# Patient Record
Sex: MALE | Race: ASIAN | NOT HISPANIC OR LATINO
[De-identification: names, ages, dates, MRNs, and addresses within clinical notes are randomized per-mention and may not be internally consistent; named-entity substitution may affect disease eponyms.]

---

## 2020-12-11 ENCOUNTER — APPOINTMENT (OUTPATIENT)
Dept: UROLOGY | Facility: CLINIC | Age: 35
End: 2020-12-11
Payer: COMMERCIAL

## 2020-12-11 VITALS
DIASTOLIC BLOOD PRESSURE: 77 MMHG | HEART RATE: 94 BPM | WEIGHT: 180 LBS | BODY MASS INDEX: 28.93 KG/M2 | SYSTOLIC BLOOD PRESSURE: 111 MMHG | RESPIRATION RATE: 18 BRPM | HEIGHT: 66 IN | OXYGEN SATURATION: 97 % | TEMPERATURE: 98.2 F

## 2020-12-11 DIAGNOSIS — Z78.9 OTHER SPECIFIED HEALTH STATUS: ICD-10-CM

## 2020-12-11 DIAGNOSIS — Z00.00 ENCOUNTER FOR GENERAL ADULT MEDICAL EXAMINATION W/OUT ABNORMAL FINDINGS: ICD-10-CM

## 2020-12-11 DIAGNOSIS — F52.4 PREMATURE EJACULATION: ICD-10-CM

## 2020-12-11 DIAGNOSIS — N46.9 MALE INFERTILITY, UNSPECIFIED: ICD-10-CM

## 2020-12-11 DIAGNOSIS — N50.3 CYST OF EPIDIDYMIS: ICD-10-CM

## 2020-12-11 PROCEDURE — 99072 ADDL SUPL MATRL&STAF TM PHE: CPT

## 2020-12-11 PROCEDURE — 99204 OFFICE O/P NEW MOD 45 MIN: CPT

## 2020-12-11 NOTE — ADDENDUM
[FreeTextEntry1] : Entered by Rolando Roy, acting as scribe for Dr. Heriberto Hagan.\par \par The documentation recorded by the scribe accurately reflects the service I personally performed and the decisions made by me.\par

## 2020-12-11 NOTE — LETTER BODY
[FreeTextEntry1] : Camden Clancy MD\par [address], [address]\[place] [address]\par [phone]\par \par Dear Dr. Clancy,\par \par Reason for Visit: Right spermatocele. Premature ejaculation. Infertility.\par \par This is a 35 year-old Mandarin-speaking working gentleman with right spermatocele, premature ejaculation, and infertility. Patient is referred for evaluation of his condition. Patient denies any gross hematuria or dysuria or urinary incontinence. The patient denies any aggravating or relieving factors. The patient denies any interference of function. The patient is entirely asymptomatic. All other review of systems are negative. He has no cancer in his family medical history. He has no previous surgical history. Past medical history, family history and social history were inquired and were noncontributory to current condition. The patient does not use tobacco or drink alcohol. Medications and allergies were reviewed. He has no known allergies to medication. \par \par On examination, the patient is a healthy-appearing gentleman in no acute distress. He is alert and oriented follows commands. He has normal mood and affect. He is normocephalic. Oral no thrush. Neck is supple. Respirations are unlabored. His abdomen is soft and nontender. Liver is nonpalpable. Bladder is nonpalpable. No CVA tenderness. Neurologically he is grossly intact. No peripheral edema. Skin without gross abnormality. He has normal male external genitalia. Normal meatus. Bilateral testes are descended intrascrotally and normal to palpation. On rectal examination, there is normal sphincter tone. The prostate is clinically benign without focal induration or nodularity.\par \par Assessment: Right spermatocele. Premature ejaculation. Infertility.\par \par I counseled the patient. I discussed the various etiologies of his symptoms. In terms of his premature ejaculation, I recommended the patient try applying topical lidocaine jelly and try using the Masters Benjamin technique. I instructed the patient on the technique. He feels comfortable trying it. In terms of his right spermatocele, I recommended the patient obtain testicular ultrasound for further evaluation. In terms of his infertility, I recommended the patient obtain semen analysis and a male hormone panel, including testosterone free and total, estradiol, LH, and prolactin. He will obtain BMP and PSA today to establish baselines. The patient will also obtain urinalysis today. Risks and alternatives were discussed. I answered the patient questions. The patient will follow-up as directed and will contact me with any questions or concerns. Thank you for the opportunity to participate in the care of Mr. WALTER. I will keep you updated on his progress.\par \par Plan: BMP. PSA. Urinalysis. Male hormone panel. Semen analysis. Testicular ultrasound. Try applying topical lidocaine jelly. Masters Benjamin technique. Follow-up as directed.

## 2020-12-13 LAB
ANION GAP SERPL CALC-SCNC: 14 MMOL/L
APPEARANCE: CLEAR
BACTERIA: NEGATIVE
BILIRUBIN URINE: NEGATIVE
BLOOD URINE: NEGATIVE
BUN SERPL-MCNC: 13 MG/DL
CALCIUM SERPL-MCNC: 8 MG/DL
CHLORIDE SERPL-SCNC: 102 MMOL/L
CO2 SERPL-SCNC: 23 MMOL/L
COLOR: NORMAL
CREAT SERPL-MCNC: 0.79 MG/DL
ESTRADIOL SERPL-MCNC: 33 PG/ML
GLUCOSE QUALITATIVE U: NEGATIVE
GLUCOSE SERPL-MCNC: 102 MG/DL
HYALINE CASTS: 0 /LPF
KETONES URINE: NEGATIVE
LEUKOCYTE ESTERASE URINE: NEGATIVE
LH SERPL-ACNC: 3.9 IU/L
MICROSCOPIC-UA: NORMAL
NITRITE URINE: NEGATIVE
PH URINE: 7.5
POTASSIUM SERPL-SCNC: 4.3 MMOL/L
PROLACTIN SERPL-MCNC: 4.6 NG/ML
PROTEIN URINE: NEGATIVE
PSA FREE FLD-MCNC: 32 %
PSA FREE SERPL-MCNC: 0.59 NG/ML
PSA SERPL-MCNC: 1.86 NG/ML
RED BLOOD CELLS URINE: 1 /HPF
SODIUM SERPL-SCNC: 139 MMOL/L
SPECIFIC GRAVITY URINE: 1.02
SQUAMOUS EPITHELIAL CELLS: 0 /HPF
UROBILINOGEN URINE: NORMAL
WHITE BLOOD CELLS URINE: 1 /HPF

## 2020-12-17 LAB
TESTOST BND SERPL-MCNC: 12.2 PG/ML
TESTOST SERPL-MCNC: 335.9 NG/DL